# Patient Record
Sex: MALE | Race: WHITE
[De-identification: names, ages, dates, MRNs, and addresses within clinical notes are randomized per-mention and may not be internally consistent; named-entity substitution may affect disease eponyms.]

---

## 2018-05-15 ENCOUNTER — HOSPITAL ENCOUNTER (OUTPATIENT)
Dept: HOSPITAL 91 - MS3 | Age: 53
Setting detail: OBSERVATION
LOS: 2 days | Discharge: HOME | End: 2018-05-17
Payer: SELF-PAY

## 2018-05-15 ENCOUNTER — HOSPITAL ENCOUNTER (OUTPATIENT)
Age: 53
Setting detail: OBSERVATION
LOS: 2 days | Discharge: HOME | End: 2018-05-17

## 2018-05-15 DIAGNOSIS — E66.9: ICD-10-CM

## 2018-05-15 DIAGNOSIS — R07.89: Primary | ICD-10-CM

## 2018-05-15 DIAGNOSIS — Z83.3: ICD-10-CM

## 2018-05-15 DIAGNOSIS — Z82.49: ICD-10-CM

## 2018-05-15 DIAGNOSIS — E11.9: ICD-10-CM

## 2018-05-15 DIAGNOSIS — F32.9: ICD-10-CM

## 2018-05-15 DIAGNOSIS — I10: ICD-10-CM

## 2018-05-15 DIAGNOSIS — F41.0: ICD-10-CM

## 2018-05-15 DIAGNOSIS — Z79.82: ICD-10-CM

## 2018-05-15 LAB
ADD MAN DIFF?: NO
ANION GAP: 13 (ref 8–16)
B-TYPE NATRIURETIC PEPTIDE: 62 PG/ML (ref 0–125)
BASOPHIL #: 0.1 10^3/UL (ref 0–0.1)
BASOPHILS %: 0.9 % (ref 0–2)
BLOOD UREA NITROGEN: 15 MG/DL (ref 7–20)
CALCIUM: 9.2 MG/DL (ref 8.4–10.2)
CARBON DIOXIDE: 29 MMOL/L (ref 21–31)
CHLORIDE: 101 MMOL/L (ref 97–110)
CREATININE: 1.13 MG/DL (ref 0.61–1.24)
EOSINOPHILS #: 0.3 10^3/UL (ref 0–0.5)
EOSINOPHILS %: 2.9 % (ref 0–7)
GLUCOSE: 143 MG/DL (ref 70–220)
HEMATOCRIT: 41.7 % (ref 42–52)
HEMOGLOBIN: 14 G/DL (ref 14–18)
INR: 0.89
LYMPHOCYTES #: 4.4 10^3/UL (ref 0.8–2.9)
LYMPHOCYTES %: 44.9 % (ref 15–51)
MEAN CORPUSCULAR HEMOGLOBIN: 29.2 PG (ref 29–33)
MEAN CORPUSCULAR HGB CONC: 33.6 G/DL (ref 32–37)
MEAN CORPUSCULAR VOLUME: 87.1 FL (ref 82–101)
MEAN PLATELET VOLUME: 9.5 FL (ref 7.4–10.4)
MONOCYTE #: 0.9 10^3/UL (ref 0.3–0.9)
MONOCYTES %: 9.3 % (ref 0–11)
NEUTROPHIL #: 4.1 10^3/UL (ref 1.6–7.5)
NEUTROPHILS %: 41.8 % (ref 39–77)
NUCLEATED RED BLOOD CELLS #: 0 10^3/UL (ref 0–0)
NUCLEATED RED BLOOD CELLS%: 0 /100WBC (ref 0–0)
PARTIAL THROMBOPLASTIN TIME: 26.6 SEC (ref 25–35)
PLATELET COUNT: 314 10^3/UL (ref 140–415)
POTASSIUM: 4 MMOL/L (ref 3.5–5.1)
PROTIME: 12.1 SEC (ref 11.9–14.9)
PT RATIO: 0.9
RED BLOOD COUNT: 4.79 10^6/UL (ref 4.7–6.1)
RED CELL DISTRIBUTION WIDTH: 13.6 % (ref 11.5–14.5)
SODIUM: 139 MMOL/L (ref 135–144)
TROPONIN-I: < 0.012 NG/ML (ref 0–0.12)
WHITE BLOOD COUNT: 9.7 10^3/UL (ref 4.8–10.8)

## 2018-05-15 PROCEDURE — 93005 ELECTROCARDIOGRAM TRACING: CPT

## 2018-05-15 PROCEDURE — 83036 HEMOGLOBIN GLYCOSYLATED A1C: CPT

## 2018-05-15 PROCEDURE — 84443 ASSAY THYROID STIM HORMONE: CPT

## 2018-05-15 PROCEDURE — 71045 X-RAY EXAM CHEST 1 VIEW: CPT

## 2018-05-15 PROCEDURE — 82553 CREATINE MB FRACTION: CPT

## 2018-05-15 PROCEDURE — 84484 ASSAY OF TROPONIN QUANT: CPT

## 2018-05-15 PROCEDURE — 84100 ASSAY OF PHOSPHORUS: CPT

## 2018-05-15 PROCEDURE — 85730 THROMBOPLASTIN TIME PARTIAL: CPT

## 2018-05-15 PROCEDURE — 80048 BASIC METABOLIC PNL TOTAL CA: CPT

## 2018-05-15 PROCEDURE — 93306 TTE W/DOPPLER COMPLETE: CPT

## 2018-05-15 PROCEDURE — 85610 PROTHROMBIN TIME: CPT

## 2018-05-15 PROCEDURE — 82962 GLUCOSE BLOOD TEST: CPT

## 2018-05-15 PROCEDURE — 83880 ASSAY OF NATRIURETIC PEPTIDE: CPT

## 2018-05-15 PROCEDURE — 36415 COLL VENOUS BLD VENIPUNCTURE: CPT

## 2018-05-15 PROCEDURE — 99285 EMERGENCY DEPT VISIT HI MDM: CPT

## 2018-05-15 PROCEDURE — 82550 ASSAY OF CK (CPK): CPT

## 2018-05-15 PROCEDURE — 80061 LIPID PANEL: CPT

## 2018-05-15 PROCEDURE — 80053 COMPREHEN METABOLIC PANEL: CPT

## 2018-05-15 PROCEDURE — 85025 COMPLETE CBC W/AUTO DIFF WBC: CPT

## 2018-05-15 PROCEDURE — 83735 ASSAY OF MAGNESIUM: CPT

## 2018-05-15 PROCEDURE — 99217: CPT

## 2018-05-16 LAB
ADD MAN DIFF?: NO
ALANINE AMINOTRANSFERASE: 51 IU/L (ref 13–69)
ALBUMIN/GLOBULIN RATIO: 1.12
ALBUMIN: 3.6 G/DL (ref 3.3–4.9)
ALKALINE PHOSPHATASE: 63 IU/L (ref 42–121)
ANION GAP: 14 (ref 8–16)
ASPARTATE AMINO TRANSFERASE: 27 IU/L (ref 15–46)
BASOPHIL #: 0.1 10^3/UL (ref 0–0.1)
BASOPHILS %: 0.9 % (ref 0–2)
BILIRUBIN,DIRECT: 0 MG/DL (ref 0–0.2)
BILIRUBIN,TOTAL: 0.7 MG/DL (ref 0.2–1.3)
BLOOD UREA NITROGEN: 15 MG/DL (ref 7–20)
CALCIUM: 9 MG/DL (ref 8.4–10.2)
CARBON DIOXIDE: 27 MMOL/L (ref 21–31)
CHLORIDE: 104 MMOL/L (ref 97–110)
CHOL/HDL RATIO: 5.7 RATIO
CHOLESTEROL: 167 MG/DL (ref 100–200)
CK INDEX: 0.8
CK INDEX: 0.9
CK-MB: 0.83 NG/ML (ref 0–2.4)
CK-MB: 0.84 NG/ML (ref 0–2.4)
CREATINE KINASE: 104 IU/L (ref 23–200)
CREATINE KINASE: 96 IU/L (ref 23–200)
CREATININE: 1.14 MG/DL (ref 0.61–1.24)
EOSINOPHILS #: 0.3 10^3/UL (ref 0–0.5)
EOSINOPHILS %: 2.9 % (ref 0–7)
GLOBULIN: 3.2 G/DL (ref 1.3–3.2)
GLUCOSE: 181 MG/DL (ref 70–220)
HDL CHOLESTEROL: 29 MG/DL (ref 28–71)
HEMATOCRIT: 42.2 % (ref 42–52)
HEMOGLOBIN A1C: 7.2 % (ref 0–5.9)
HEMOGLOBIN: 14.1 G/DL (ref 14–18)
LDL CHOLESTEROL,CALCULATED: 102 MG/DL
LYMPHOCYTES #: 3.8 10^3/UL (ref 0.8–2.9)
LYMPHOCYTES %: 43.4 % (ref 15–51)
MAGNESIUM: 1.9 MG/DL (ref 1.7–2.5)
MEAN CORPUSCULAR HEMOGLOBIN: 29.3 PG (ref 29–33)
MEAN CORPUSCULAR HGB CONC: 33.4 G/DL (ref 32–37)
MEAN CORPUSCULAR VOLUME: 87.7 FL (ref 82–101)
MEAN PLATELET VOLUME: 9.3 FL (ref 7.4–10.4)
MONOCYTE #: 0.8 10^3/UL (ref 0.3–0.9)
MONOCYTES %: 9.6 % (ref 0–11)
NEUTROPHIL #: 3.7 10^3/UL (ref 1.6–7.5)
NEUTROPHILS %: 43 % (ref 39–77)
NUCLEATED RED BLOOD CELLS #: 0 10^3/UL (ref 0–0)
NUCLEATED RED BLOOD CELLS%: 0 /100WBC (ref 0–0)
PLATELET COUNT: 302 10^3/UL (ref 140–415)
POTASSIUM: 3.8 MMOL/L (ref 3.5–5.1)
RED BLOOD COUNT: 4.81 10^6/UL (ref 4.7–6.1)
RED CELL DISTRIBUTION WIDTH: 13.4 % (ref 11.5–14.5)
SODIUM: 141 MMOL/L (ref 135–144)
THYROID STIMULATING HORMONE: 1.47 MIU/L (ref 0.47–4.68)
TOTAL PROTEIN: 6.8 G/DL (ref 6.1–8.1)
TRIGLYCERIDES: 178 MG/DL (ref 0–149)
TROPONIN-I: < 0.012 NG/ML (ref 0–0.12)
TROPONIN-I: < 0.012 NG/ML (ref 0–0.12)
WHITE BLOOD COUNT: 8.7 10^3/UL (ref 4.8–10.8)

## 2018-05-16 RX ADMIN — FLUOXETINE 1 MG: 20 CAPSULE ORAL at 08:46

## 2018-05-16 RX ADMIN — INSULIN ASPART 1 UNIT: 100 INJECTION, SOLUTION INTRAVENOUS; SUBCUTANEOUS at 17:05

## 2018-05-16 RX ADMIN — INSULIN ASPART 1 UNIT: 100 INJECTION, SOLUTION INTRAVENOUS; SUBCUTANEOUS at 11:15

## 2018-05-16 RX ADMIN — ACETAMINOPHEN 1 MG: 325 TABLET, FILM COATED ORAL at 08:05

## 2018-05-16 RX ADMIN — INSULIN ASPART 1 UNIT: 100 INJECTION, SOLUTION INTRAVENOUS; SUBCUTANEOUS at 08:11

## 2018-05-16 RX ADMIN — ATENOLOL 1 MG: 50 TABLET ORAL at 08:47

## 2018-05-16 RX ADMIN — INSULIN ASPART 1 UNIT: 100 INJECTION, SOLUTION INTRAVENOUS; SUBCUTANEOUS at 21:00

## 2018-05-16 RX ADMIN — ASPIRIN 1 MG: 81 TABLET, COATED ORAL at 08:47

## 2018-05-16 RX ADMIN — LOSARTAN POTASSIUM 1 MG: 50 TABLET ORAL at 08:47

## 2018-05-16 RX ADMIN — LORAZEPAM 1 MG: 0.5 TABLET ORAL at 20:05

## 2018-05-17 LAB
ADD MAN DIFF?: NO
ALANINE AMINOTRANSFERASE: 49 IU/L (ref 13–69)
ALBUMIN/GLOBULIN RATIO: 1.11
ALBUMIN: 3.8 G/DL (ref 3.3–4.9)
ALKALINE PHOSPHATASE: 67 IU/L (ref 42–121)
ANION GAP: 13 (ref 8–16)
ASPARTATE AMINO TRANSFERASE: 40 IU/L (ref 15–46)
BASOPHIL #: 0.1 10^3/UL (ref 0–0.1)
BASOPHILS %: 0.9 % (ref 0–2)
BILIRUBIN,DIRECT: 0 MG/DL (ref 0–0.2)
BILIRUBIN,TOTAL: 0.7 MG/DL (ref 0.2–1.3)
BLOOD UREA NITROGEN: 14 MG/DL (ref 7–20)
CALCIUM: 9.2 MG/DL (ref 8.4–10.2)
CARBON DIOXIDE: 28 MMOL/L (ref 21–31)
CHLORIDE: 104 MMOL/L (ref 97–110)
CREATININE: 1.01 MG/DL (ref 0.61–1.24)
EOSINOPHILS #: 0.3 10^3/UL (ref 0–0.5)
EOSINOPHILS %: 3.4 % (ref 0–7)
GLOBULIN: 3.4 G/DL (ref 1.3–3.2)
GLUCOSE: 119 MG/DL (ref 70–220)
HEMATOCRIT: 43.4 % (ref 42–52)
HEMOGLOBIN: 14.6 G/DL (ref 14–18)
LYMPHOCYTES #: 3.5 10^3/UL (ref 0.8–2.9)
LYMPHOCYTES %: 40.2 % (ref 15–51)
MAGNESIUM: 1.7 MG/DL (ref 1.7–2.5)
MEAN CORPUSCULAR HEMOGLOBIN: 29.5 PG (ref 29–33)
MEAN CORPUSCULAR HGB CONC: 33.6 G/DL (ref 32–37)
MEAN CORPUSCULAR VOLUME: 87.7 FL (ref 82–101)
MEAN PLATELET VOLUME: 9.5 FL (ref 7.4–10.4)
MONOCYTE #: 1 10^3/UL (ref 0.3–0.9)
MONOCYTES %: 11.6 % (ref 0–11)
NEUTROPHIL #: 3.8 10^3/UL (ref 1.6–7.5)
NEUTROPHILS %: 43.7 % (ref 39–77)
NUCLEATED RED BLOOD CELLS #: 0 10^3/UL (ref 0–0)
NUCLEATED RED BLOOD CELLS%: 0 /100WBC (ref 0–0)
PHOSPHORUS: 5.2 MG/DL (ref 2.5–4.9)
PLATELET COUNT: 326 10^3/UL (ref 140–415)
POTASSIUM: 3.9 MMOL/L (ref 3.5–5.1)
RED BLOOD COUNT: 4.95 10^6/UL (ref 4.7–6.1)
RED CELL DISTRIBUTION WIDTH: 13.5 % (ref 11.5–14.5)
SODIUM: 141 MMOL/L (ref 135–144)
TOTAL PROTEIN: 7.2 G/DL (ref 6.1–8.1)
TROPONIN-I: < 0.012 NG/ML (ref 0–0.12)
WHITE BLOOD COUNT: 8.8 10^3/UL (ref 4.8–10.8)

## 2018-05-17 RX ADMIN — FLUOXETINE 1 MG: 20 CAPSULE ORAL at 08:51

## 2018-05-17 RX ADMIN — ACETAMINOPHEN 1 MG: 325 TABLET, FILM COATED ORAL at 09:21

## 2018-05-17 RX ADMIN — ASPIRIN 1 MG: 81 TABLET, COATED ORAL at 08:51

## 2018-05-17 RX ADMIN — INSULIN ASPART 1 UNIT: 100 INJECTION, SOLUTION INTRAVENOUS; SUBCUTANEOUS at 07:05

## 2018-05-17 RX ADMIN — INSULIN ASPART 1 UNIT: 100 INJECTION, SOLUTION INTRAVENOUS; SUBCUTANEOUS at 11:15

## 2018-05-17 RX ADMIN — LOSARTAN POTASSIUM 1 MG: 50 TABLET ORAL at 08:51

## 2018-05-17 RX ADMIN — ATENOLOL 1 MG: 50 TABLET ORAL at 08:51

## 2019-08-26 ENCOUNTER — HOSPITAL ENCOUNTER (EMERGENCY)
Dept: HOSPITAL 72 - EMR | Age: 54
Discharge: HOME | End: 2019-08-26
Payer: MEDICAID

## 2019-08-26 VITALS — WEIGHT: 200 LBS | BODY MASS INDEX: 34.15 KG/M2 | HEIGHT: 64 IN

## 2019-08-26 VITALS — SYSTOLIC BLOOD PRESSURE: 133 MMHG | DIASTOLIC BLOOD PRESSURE: 75 MMHG

## 2019-08-26 VITALS — DIASTOLIC BLOOD PRESSURE: 75 MMHG | SYSTOLIC BLOOD PRESSURE: 119 MMHG

## 2019-08-26 VITALS — SYSTOLIC BLOOD PRESSURE: 124 MMHG | DIASTOLIC BLOOD PRESSURE: 72 MMHG

## 2019-08-26 DIAGNOSIS — R74.8: ICD-10-CM

## 2019-08-26 DIAGNOSIS — E11.65: ICD-10-CM

## 2019-08-26 DIAGNOSIS — F41.9: ICD-10-CM

## 2019-08-26 DIAGNOSIS — R07.9: Primary | ICD-10-CM

## 2019-08-26 DIAGNOSIS — I10: ICD-10-CM

## 2019-08-26 DIAGNOSIS — N28.9: ICD-10-CM

## 2019-08-26 LAB
ADD MANUAL DIFF: NO
ALBUMIN SERPL-MCNC: 3.7 G/DL (ref 3.4–5)
ALBUMIN/GLOB SERPL: 0.9 {RATIO} (ref 1–2.7)
ALP SERPL-CCNC: 88 U/L (ref 46–116)
ALT SERPL-CCNC: 43 U/L (ref 12–78)
ANION GAP SERPL CALC-SCNC: 12 MMOL/L (ref 5–15)
APTT BLD: 28 SEC (ref 23–33)
AST SERPL-CCNC: 24 U/L (ref 15–37)
BASOPHILS NFR BLD AUTO: 1.3 % (ref 0–2)
BILIRUB SERPL-MCNC: 0.5 MG/DL (ref 0.2–1)
BUN SERPL-MCNC: 14 MG/DL (ref 7–18)
CALCIUM SERPL-MCNC: 9.1 MG/DL (ref 8.5–10.1)
CHLORIDE SERPL-SCNC: 104 MMOL/L (ref 98–107)
CK SERPL-CCNC: 162 U/L (ref 26–308)
CO2 SERPL-SCNC: 23 MMOL/L (ref 21–32)
CREAT SERPL-MCNC: 1.5 MG/DL (ref 0.55–1.3)
EOSINOPHIL NFR BLD AUTO: 3.2 % (ref 0–3)
ERYTHROCYTE [DISTWIDTH] IN BLOOD BY AUTOMATED COUNT: 13.1 % (ref 11.6–14.8)
GLOBULIN SER-MCNC: 4 G/DL
HCT VFR BLD CALC: 41.8 % (ref 42–52)
HGB BLD-MCNC: 14 G/DL (ref 14.2–18)
INR PPP: 1.1 (ref 0.9–1.1)
LYMPHOCYTES NFR BLD AUTO: 31.3 % (ref 20–45)
MCV RBC AUTO: 90 FL (ref 80–99)
MONOCYTES NFR BLD AUTO: 6.7 % (ref 1–10)
NEUTROPHILS NFR BLD AUTO: 57.5 % (ref 45–75)
PLATELET # BLD: 302 K/UL (ref 150–450)
POTASSIUM SERPL-SCNC: 3.9 MMOL/L (ref 3.5–5.1)
RBC # BLD AUTO: 4.66 M/UL (ref 4.7–6.1)
SODIUM SERPL-SCNC: 139 MMOL/L (ref 136–145)
WBC # BLD AUTO: 9.3 K/UL (ref 4.8–10.8)

## 2019-08-26 PROCEDURE — 83690 ASSAY OF LIPASE: CPT

## 2019-08-26 PROCEDURE — 84484 ASSAY OF TROPONIN QUANT: CPT

## 2019-08-26 PROCEDURE — 80053 COMPREHEN METABOLIC PANEL: CPT

## 2019-08-26 PROCEDURE — 82550 ASSAY OF CK (CPK): CPT

## 2019-08-26 PROCEDURE — 80307 DRUG TEST PRSMV CHEM ANLYZR: CPT

## 2019-08-26 PROCEDURE — 85025 COMPLETE CBC W/AUTO DIFF WBC: CPT

## 2019-08-26 PROCEDURE — 83880 ASSAY OF NATRIURETIC PEPTIDE: CPT

## 2019-08-26 PROCEDURE — 85730 THROMBOPLASTIN TIME PARTIAL: CPT

## 2019-08-26 PROCEDURE — 85610 PROTHROMBIN TIME: CPT

## 2019-08-26 PROCEDURE — 36415 COLL VENOUS BLD VENIPUNCTURE: CPT

## 2019-08-26 PROCEDURE — 93005 ELECTROCARDIOGRAM TRACING: CPT

## 2019-08-26 PROCEDURE — 99284 EMERGENCY DEPT VISIT MOD MDM: CPT

## 2019-08-26 PROCEDURE — 71045 X-RAY EXAM CHEST 1 VIEW: CPT

## 2019-08-26 NOTE — EMERGENCY ROOM REPORT
History of Present Illness


General


Chief Complaint:  Chest Pain


Source:  Patient, EMS





Present Illness


HPI


Patient is brought in by EMS.  He is complaining about chest pain.  His blood 

pressure was fluctuating also before.  He took an extra clonidine.  This has 

been going on for 2 months.  He was l hospitalized in [place] last weekend.  

He had a stress test that was normal and an echocardiogram that was normal 

also.  He was told that it is caused by his nerves.  He had the same problem 10 

years ago was improved with Ativan.  No Ativan was prescribed.  The pain began 

when he was sitting in a chair at rest.  It is not exertional.  He feels 

pressure in his chest.  Initially it was 6/10 and after treatment in the field 

with nitrates and aspirin reduced to 4/10.  Slight nausea no diaphoresis.  The 

pain does not radiate.  It is pressure in his chest.





Recently he was switched from losartan to clonidine.  He was told to take 

clonidine if his blood pressure was elevated.  He denies dizziness, syncope, 

visual change or headache.  He denies suicidal or homicidal ideation.





His risk factors for cardiac disease are hypertension and diabetes.  He does 

not smoke and does not have a family history.





No headache or rashes.  No joint pain.  No calf tenderness or edema.


Allergies:  


Coded Allergies:  


     MORPHINE (Verified  Allergy, Unknown, 12/29/11)





Patient History


Past Medical History:  see triage record


Social History:  Denies: smoking, alcohol use, drug use


Social History Narrative


At home


Reviewed Nursing Documentation:  PMH: Agreed; PSxH: Agreed





Nursing Documentation-PMH


Past Medical History:  No History, Except For


Hx Cardiac Problems:  Yes


Hx Hypertension:  Yes


Hx Diabetes:  Yes


Hx Cancer:  No


Hx Gastrointestinal Problems:  No


Hx Neurological Problems:  Yes


Hx Vertigo:  Yes


Hx Dizziness:  Yes


Hx Syncope:  Yes


Hx Headaches:  Yes


Hx Weakness:  Yes


Hx Fatigue:  Yes





Review of Systems


All Other Systems:  negative except mentioned in HPI





Physical Exam





Vital Signs








  Date Time  Temp Pulse Resp B/P (MAP) Pulse Ox O2 Delivery O2 Flow Rate FiO2


 


8/26/19 18:38 98.1 73 18 133/75 (94) 98 Room Air  








Sp02 EP Interpretation:  reviewed, normal


General Appearance:  well appearing, no apparent distress, GCS 15


Head:  normocephalic


Eyes:  bilateral eye normal inspection, bilateral eye PERRL, bilateral eye EOMI


ENT:  moist mucus membranes


Neck:  supple


Respiratory:  chest non-tender, lungs clear, normal breath sounds


Cardiovascular #1:  regular rate, rhythm, no edema, no JVD


Cardiovascular #2:  2+ radial (R)


Gastrointestinal:  normal inspection, normal bowel sounds, non tender, no mass, 

non-distended


Musculoskeletal:  back normal, normal range of motion


Neurologic:  alert, oriented x3, grossly normal


Psychiatric:  no suicidal/homicidal ideation, anxious - Minimally


Skin:  no rash





Medical Decision Making


Diagnostic Impression:  


 Primary Impression:  


 Chest pain


 Qualified Codes:  R07.9 - Chest pain, unspecified


 Additional Impressions:  


 Anxiety


 Elevated lipase


 Renal insufficiency


 Hyperglycemia


ER Course


Except for elevated glucose and the patient presents with substernal chest pain 

that with 2 risk factors and improvement with nitrates in the field and 

aspirin.  Differential includes acute microinfarction, unstable angina, acute 

coronary syndrome, anxiety, GERD amongst others.  It is reassuring that he had 

full cardiac work-up done last weekend that was negative.  However we need to 

exclude cardiac damage at this time.  His intermittent use of antihypertensive 

is disconcerting.  Patient is placed on a cardiac monitor and given a inch of 

nitro glycerin paste.





EKG normal sinus rhythm nonspecific ST-T wave changes.  CBC and CMP normal 

creatinine of 1.5.  Elevated lipase.





No abdominal pain.





Patient improved with treatment.  Discussed findings.  Also discussed treatment 

plan.  Advised not to take clonidine on an occasional basis but to seek to 

stabilize his blood pressure medication.





No medical emergency at this time.  The fact he had a recent treadmill is 

reassuring.





Patient stable for outpatient observation and treatment.





Laboratory Tests








Test


  8/26/19


19:00


 


White Blood Count


  9.3 K/UL


(4.8-10.8)


 


Red Blood Count


  4.66 M/UL


(4.70-6.10)  L


 


Hemoglobin


  14.0 G/DL


(14.2-18.0)  L


 


Hematocrit


  41.8 %


(42.0-52.0)  L


 


Mean Corpuscular Volume 90 FL (80-99)  


 


Mean Corpuscular Hemoglobin


  30.0 PG


(27.0-31.0)


 


Mean Corpuscular Hemoglobin


Concent 33.4 G/DL


(32.0-36.0)


 


Red Cell Distribution Width


  13.1 %


(11.6-14.8)


 


Platelet Count


  302 K/UL


(150-450)


 


Mean Platelet Volume


  5.7 FL


(6.5-10.1)  L


 


Neutrophils (%) (Auto)


  57.5 %


(45.0-75.0)


 


Lymphocytes (%) (Auto)


  31.3 %


(20.0-45.0)


 


Monocytes (%) (Auto)


  6.7 %


(1.0-10.0)


 


Eosinophils (%) (Auto)


  3.2 %


(0.0-3.0)  H


 


Basophils (%) (Auto)


  1.3 %


(0.0-2.0)


 


Prothrombin Time


  11.4 SEC


(9.30-11.50)


 


Prothrombin Time INR 1.1 (0.9-1.1)  


 


PTT


  28 SEC (23-33)


 


 


Sodium Level


  139 MMOL/L


(136-145)


 


Potassium Level


  3.9 MMOL/L


(3.5-5.1)


 


Chloride Level


  104 MMOL/L


()


 


Carbon Dioxide Level


  23 MMOL/L


(21-32)


 


Anion Gap


  12 mmol/L


(5-15)


 


Blood Urea Nitrogen


  14 mg/dL


(7-18)


 


Creatinine


  1.5 MG/DL


(0.55-1.30)  H


 


Estimate Glomerular


Filtration Rate 48.8 mL/min


(>60)


 


Glucose Level


  229 MG/DL


()  H


 


Calcium Level


  9.1 MG/DL


(8.5-10.1)


 


Total Bilirubin


  0.5 MG/DL


(0.2-1.0)


 


Aspartate Amino Transferase


(AST) 24 U/L (15-37)


 


 


Alanine Aminotransferase (ALT)


  43 U/L (12-78)


 


 


Alkaline Phosphatase


  88 U/L


()


 


Total Creatine Kinase


  162 U/L


()


 


Troponin I


  0.000 ng/mL


(0.000-0.056)


 


Pro-B-Type Natriuretic Peptide


  50 pg/mL


(0-125)


 


Total Protein


  7.7 G/DL


(6.4-8.2)


 


Albumin


  3.7 G/DL


(3.4-5.0)


 


Globulin 4.0 g/dL  


 


Albumin/Globulin Ratio


  0.9 (1.0-2.7)


L


 


Lipase


  469 U/L


()  H


 


Urine Opiates Screen


  Negative


(NEGATIVE)


 


Urine Barbiturates Screen


  Negative


(NEGATIVE)


 


Phencyclidine (PCP) Screen


  Negative


(NEGATIVE)


 


Urine Amphetamines Screen


  Negative


(NEGATIVE)


 


Urine Benzodiazepines Screen


  Negative


(NEGATIVE)


 


Urine Cocaine Screen


  Negative


(NEGATIVE)


 


Urine Marijuana (THC) Screen


  Negative


(NEGATIVE)








EKG Diagnostic Results


Rate:  normal


Rhythm:  NSR


ST Segments:  no acute changes





Rhythm Strip Diag. Results


EP Interpretation:  yes


Rhythm:  NSR, no PVC's, no ectopy





Chest X-Ray Diagnostic Results


Chest X-Ray Diagnostic Results :  


   Chest X-Ray Ordered:  Yes


   # of Views/Limited/Complete:  1 View


   Indication:  Chest Pain


   EP Interpretation:  Yes


   Interpretation:  no consolidation, no effusion, no pneumothorax


   Impression:  No acute disease


   Electronically Signed by:  Electronically signed by Nader Sotomayor MD





Last Vital Signs








  Date Time  Temp Pulse Resp B/P (MAP) Pulse Ox O2 Delivery O2 Flow Rate FiO2


 


8/26/19 22:03 98.4 63 18 119/75 96 Room Air  








Status:  improved


Disposition:  HOME, SELF-CARE


Condition:  Improved


Scripts


Lorazepam* (ATIVAN*) 0.5 Mg Tablet


0.5 MG ORAL THREE TIMES A DAY, #10 TAB


   Prov: Nader Sotomayor MD         8/26/19











Nader Sotomayor MD Aug 26, 2019 19:17

## 2019-08-26 NOTE — NUR
ED Nurse Note:



RA 34 D/T SUBSTERNAL  CP  X1 HOUR. PT GIVEN  325 ASA, 2 SPRAY NITRO PRIOR TO 
ARRIVAL

## 2019-08-27 NOTE — CARDIOLOGY REPORT
--------------- APPROVED REPORT --------------





EKG Measurement

Heart [id number]

[id number]

[id number]

[id number]

[id number]





Normal sinus rhythm

Nonspecific T wave abnormality

Abnormal ECG

## 2020-02-17 ENCOUNTER — HOSPITAL ENCOUNTER (EMERGENCY)
Dept: HOSPITAL 87 - ER | Age: 55
LOS: 1 days | Discharge: HOME | End: 2020-02-18
Payer: COMMERCIAL

## 2020-02-17 VITALS — WEIGHT: 205.03 LBS | BODY MASS INDEX: 34.16 KG/M2 | HEIGHT: 65 IN

## 2020-02-17 DIAGNOSIS — E11.9: ICD-10-CM

## 2020-02-17 DIAGNOSIS — Z88.6: ICD-10-CM

## 2020-02-17 DIAGNOSIS — R07.9: Primary | ICD-10-CM

## 2020-02-17 DIAGNOSIS — Z79.82: ICD-10-CM

## 2020-02-17 DIAGNOSIS — Z79.899: ICD-10-CM

## 2020-02-17 DIAGNOSIS — I10: ICD-10-CM

## 2020-02-17 LAB
BASOPHILS NFR BLD AUTO: 1.2 % (ref 0–2)
CHLORIDE SERPL-SCNC: 100 MEQ/L (ref 98–107)
EOSINOPHIL NFR BLD AUTO: 2.1 % (ref 0–5)
ERYTHROCYTE [DISTWIDTH] IN BLOOD BY AUTOMATED COUNT: 14.8 % (ref 11.6–14.6)
HCT VFR BLD AUTO: 40.6 % (ref 42–52)
HGB BLD-MCNC: 13.8 G/DL (ref 14–18)
LYMPHOCYTES NFR BLD AUTO: 24.7 % (ref 20–50)
MCH RBC QN AUTO: 29.5 PG (ref 28–32)
MCV RBC AUTO: 86.6 FL (ref 80–94)
MONOCYTES NFR BLD AUTO: 5 % (ref 2–8)
NEUTROPHILS NFR BLD AUTO: 67 % (ref 40–76)
PLATELET # BLD AUTO: 291 X1000/UL (ref 130–400)
PMV BLD AUTO: 7.8 FL (ref 7.4–10.4)
RBC # BLD AUTO: 4.68 MILL/UL (ref 4.7–6.1)

## 2020-02-17 PROCEDURE — 93005 ELECTROCARDIOGRAM TRACING: CPT

## 2020-02-17 PROCEDURE — 71045 X-RAY EXAM CHEST 1 VIEW: CPT

## 2020-02-17 PROCEDURE — 99285 EMERGENCY DEPT VISIT HI MDM: CPT

## 2020-02-17 PROCEDURE — 80053 COMPREHEN METABOLIC PANEL: CPT

## 2020-02-17 PROCEDURE — 83880 ASSAY OF NATRIURETIC PEPTIDE: CPT

## 2020-02-17 PROCEDURE — 84484 ASSAY OF TROPONIN QUANT: CPT

## 2020-02-17 PROCEDURE — 85025 COMPLETE CBC W/AUTO DIFF WBC: CPT

## 2020-02-17 PROCEDURE — 36415 COLL VENOUS BLD VENIPUNCTURE: CPT

## 2020-02-18 VITALS — DIASTOLIC BLOOD PRESSURE: 73 MMHG | SYSTOLIC BLOOD PRESSURE: 134 MMHG

## 2021-05-21 ENCOUNTER — HOSPITAL ENCOUNTER (EMERGENCY)
Dept: HOSPITAL 87 - ER | Age: 56
LOS: 1 days | Discharge: LEFT BEFORE DISCHARGE | End: 2021-05-22
Payer: MEDICAID

## 2021-05-21 VITALS — BODY MASS INDEX: 31.89 KG/M2 | WEIGHT: 198.42 LBS | HEIGHT: 66 IN

## 2021-05-21 DIAGNOSIS — E11.9: ICD-10-CM

## 2021-05-21 DIAGNOSIS — I10: ICD-10-CM

## 2021-05-21 DIAGNOSIS — Z79.82: ICD-10-CM

## 2021-05-21 DIAGNOSIS — Z88.6: ICD-10-CM

## 2021-05-21 DIAGNOSIS — R07.9: Primary | ICD-10-CM

## 2021-05-21 LAB
BASOPHILS NFR BLD AUTO: 1.1 % (ref 0–2)
CHLORIDE SERPL-SCNC: 103 MEQ/L (ref 98–107)
EOSINOPHIL NFR BLD AUTO: 6.1 % (ref 0–5)
ERYTHROCYTE [DISTWIDTH] IN BLOOD BY AUTOMATED COUNT: 15.2 % (ref 11.6–14.6)
HCT VFR BLD AUTO: 37.1 % (ref 42–52)
HGB BLD-MCNC: 13 G/DL (ref 14–18)
LYMPHOCYTES NFR BLD AUTO: 31.2 % (ref 20–50)
MCH RBC QN AUTO: 30.2 PG (ref 28–32)
MCV RBC AUTO: 86.2 FL (ref 80–94)
MONOCYTES NFR BLD AUTO: 6 % (ref 2–8)
NEUTROPHILS NFR BLD AUTO: 55.6 % (ref 40–76)
PLATELET # BLD AUTO: 283 X1000/UL (ref 130–400)
PMV BLD AUTO: 7.6 FL (ref 7.4–10.4)
RBC # BLD AUTO: 4.3 MILL/UL (ref 4.7–6.1)

## 2021-05-21 PROCEDURE — 83880 ASSAY OF NATRIURETIC PEPTIDE: CPT

## 2021-05-21 PROCEDURE — 99284 EMERGENCY DEPT VISIT MOD MDM: CPT

## 2021-05-21 PROCEDURE — 80053 COMPREHEN METABOLIC PANEL: CPT

## 2021-05-21 PROCEDURE — 85025 COMPLETE CBC W/AUTO DIFF WBC: CPT

## 2021-05-21 PROCEDURE — 84484 ASSAY OF TROPONIN QUANT: CPT

## 2021-05-21 PROCEDURE — 36415 COLL VENOUS BLD VENIPUNCTURE: CPT

## 2021-05-21 PROCEDURE — 71045 X-RAY EXAM CHEST 1 VIEW: CPT

## 2021-05-22 VITALS — DIASTOLIC BLOOD PRESSURE: 73 MMHG | SYSTOLIC BLOOD PRESSURE: 108 MMHG
